# Patient Record
Sex: FEMALE | Race: WHITE | HISPANIC OR LATINO | Employment: FULL TIME | URBAN - METROPOLITAN AREA
[De-identification: names, ages, dates, MRNs, and addresses within clinical notes are randomized per-mention and may not be internally consistent; named-entity substitution may affect disease eponyms.]

---

## 2022-09-30 ENCOUNTER — HOSPITAL ENCOUNTER (OUTPATIENT)
Dept: ULTRASOUND IMAGING | Facility: CLINIC | Age: 27
Discharge: HOME | End: 2022-09-30
Attending: NURSE PRACTITIONER | Admitting: NURSE PRACTITIONER
Payer: COMMERCIAL

## 2022-09-30 DIAGNOSIS — R10.9 ABDOMINAL PAIN: ICD-10-CM

## 2022-09-30 PROCEDURE — 76830 TRANSVAGINAL US NON-OB: CPT

## 2022-09-30 PROCEDURE — 76856 US EXAM PELVIC COMPLETE: CPT | Mod: 26 | Performed by: RADIOLOGY

## 2022-09-30 PROCEDURE — 76830 TRANSVAGINAL US NON-OB: CPT | Mod: 26 | Performed by: RADIOLOGY

## 2022-11-07 ENCOUNTER — ANCILLARY PROCEDURE (OUTPATIENT)
Dept: ULTRASOUND IMAGING | Facility: CLINIC | Age: 27
End: 2022-11-07
Attending: NURSE PRACTITIONER
Payer: COMMERCIAL

## 2022-11-07 DIAGNOSIS — R10.9 ABDOMINAL PAIN: ICD-10-CM

## 2022-11-07 PROCEDURE — 76830 TRANSVAGINAL US NON-OB: CPT | Mod: GC | Performed by: RADIOLOGY

## 2022-11-07 PROCEDURE — 76856 US EXAM PELVIC COMPLETE: CPT | Mod: GC | Performed by: RADIOLOGY

## 2023-04-06 ENCOUNTER — NURSE TRIAGE (OUTPATIENT)
Dept: NURSING | Facility: CLINIC | Age: 28
End: 2023-04-06
Payer: COMMERCIAL

## 2023-04-06 NOTE — TELEPHONE ENCOUNTER
Patient donated blood 2 days ago.  Since then patient hasnt been feeling great. Today patient had chills, no fever, fatigued, covid test negative.    Patient had some dizziness earlier in the day, not now, has occasionally felt her heart beating a little faster, no breathing issues, hasnt passed out, is able to walk but couldn't make it through work today and came home early.    Patient has been pushing water, but not helping much.     Care advise: call the number on the blood drive information sheet to report symptoms, call Krish tomorrow for an appointment.  Continue to push fluids and rest.    Dejah Helm RN   04/06/23 6:29 PM  [facility] Nurse Advisor    Reason for Disposition    [1] MODERATE weakness (i.e., interferes with work, school, normal activities) AND [2] persists > 3 days    Additional Information    Negative: SEVERE difficulty breathing (e.g., struggling for each breath, speaks in single words)    Negative: Shock suspected (e.g., cold/pale/clammy skin, too weak to stand, low BP, rapid pulse)    Negative: Difficult to awaken or acting confused (e.g., disoriented, slurred speech)    Negative: [1] Fainted > 15 minutes ago AND [2] still feels too weak or dizzy to stand    Negative: [1] SEVERE weakness (i.e., unable to walk or barely able to walk, requires support) AND [2] new-onset or worsening    Negative: Sounds like a life-threatening emergency to the triager    Negative: Weakness of the face, arm or leg on one side of the body    Negative: [1] Diabetes mellitus AND [2] weakness from low blood sugar (i.e., < 60 mg/dl or 3.5 mmol/l)    Negative: Heat exhaustion suspected (i.e., dehydration from heat exposure)    Negative: Chest pain    Negative: Vomiting is main symptom    Negative: Diarrhea is main symptom    Negative: Difficulty breathing    Negative: Heart beating < 50 beats per minute OR > 140 beats per minute    Negative: Extra heart beats OR irregular heart beating  (i.e.,  "\"palpitations\")    Negative: Follows bleeding (e.g., from vomiting, rectum, vagina; Exception: small brief weakness from sight of a small amount blood)    Negative: Black or tarry bowel movements    Negative: [1] Drinking very little AND [2] dehydration suspected (e.g., no urine > 12 hours, very dry mouth, very lightheaded)    Negative: Patient sounds very sick or weak to the triager    Negative: [1] MODERATE weakness (i.e., interferes with work, school, normal activities) AND [2] cause unknown  (Exceptions: weakness with acute minor illness, or weakness from poor fluid intake)    Negative: [1] MODERATE weakness AND [2] from poor fluid intake AND [3] no improvement after 2 hours of rest and fluids    Negative: [1] Fever > 103 F (39.4 C) AND [2] not able to get the fever down using Fever Care Advice    Negative: [1] Fever > 101 F (38.3 C) AND [2] age > 60 years    Negative: [1] Fever > 100.0 F (37.8 C) AND [2] bedridden (e.g., nursing home patient, CVA, chronic illness, recovering from surgery)    Negative: [1] Fever > 100.0 F (37.8 C) AND [2] diabetes mellitus or weak immune system (e.g., HIV positive, cancer chemo, splenectomy, organ transplant, chronic steroids)    Negative: Pale skin (pallor)    Protocols used: WEAKNESS (GENERALIZED) AND FATIGUE-A-AH      "

## 2023-11-21 ENCOUNTER — HOSPITAL ENCOUNTER (OUTPATIENT)
Dept: ULTRASOUND IMAGING | Facility: CLINIC | Age: 28
Discharge: HOME | End: 2023-11-21
Attending: NURSE PRACTITIONER | Admitting: NURSE PRACTITIONER
Payer: COMMERCIAL

## 2023-11-21 DIAGNOSIS — R10.2 PELVIC PRESSURE IN FEMALE: ICD-10-CM

## 2023-11-21 DIAGNOSIS — Z87.42 HISTORY OF OVARIAN CYST: ICD-10-CM

## 2023-11-21 PROCEDURE — 76856 US EXAM PELVIC COMPLETE: CPT | Mod: 26 | Performed by: STUDENT IN AN ORGANIZED HEALTH CARE EDUCATION/TRAINING PROGRAM

## 2023-11-21 PROCEDURE — 76830 TRANSVAGINAL US NON-OB: CPT

## 2023-11-21 PROCEDURE — 76830 TRANSVAGINAL US NON-OB: CPT | Mod: 26 | Performed by: STUDENT IN AN ORGANIZED HEALTH CARE EDUCATION/TRAINING PROGRAM

## 2024-04-17 ENCOUNTER — NURSE TRIAGE (OUTPATIENT)
Dept: NURSING | Facility: CLINIC | Age: 29
End: 2024-04-17
Payer: COMMERCIAL

## 2024-04-17 VITALS
TEMPERATURE: 98.7 F | OXYGEN SATURATION: 99 % | DIASTOLIC BLOOD PRESSURE: 75 MMHG | WEIGHT: 137 LBS | HEART RATE: 74 BPM | HEIGHT: 62 IN | BODY MASS INDEX: 25.21 KG/M2 | SYSTOLIC BLOOD PRESSURE: 120 MMHG | RESPIRATION RATE: 20 BRPM

## 2024-04-17 PROCEDURE — 99284 EMERGENCY DEPT VISIT MOD MDM: CPT | Performed by: EMERGENCY MEDICINE

## 2024-04-17 PROCEDURE — 99283 EMERGENCY DEPT VISIT LOW MDM: CPT | Performed by: EMERGENCY MEDICINE

## 2024-04-17 RX ORDER — BUSPIRONE HYDROCHLORIDE 10 MG/1
10 TABLET ORAL 2 TIMES DAILY
COMMUNITY

## 2024-04-17 ASSESSMENT — COLUMBIA-SUICIDE SEVERITY RATING SCALE - C-SSRS
6. HAVE YOU EVER DONE ANYTHING, STARTED TO DO ANYTHING, OR PREPARED TO DO ANYTHING TO END YOUR LIFE?: NO
2. HAVE YOU ACTUALLY HAD ANY THOUGHTS OF KILLING YOURSELF IN THE PAST MONTH?: NO
1. IN THE PAST MONTH, HAVE YOU WISHED YOU WERE DEAD OR WISHED YOU COULD GO TO SLEEP AND NOT WAKE UP?: NO

## 2024-04-18 ENCOUNTER — HOSPITAL ENCOUNTER (EMERGENCY)
Facility: CLINIC | Age: 29
Discharge: HOME | End: 2024-04-18
Attending: EMERGENCY MEDICINE | Admitting: EMERGENCY MEDICINE
Payer: COMMERCIAL

## 2024-04-18 ENCOUNTER — APPOINTMENT (OUTPATIENT)
Dept: ULTRASOUND IMAGING | Facility: CLINIC | Age: 29
End: 2024-04-18
Attending: EMERGENCY MEDICINE
Payer: COMMERCIAL

## 2024-04-18 DIAGNOSIS — L03.90 CELLULITIS, UNSPECIFIED CELLULITIS SITE: ICD-10-CM

## 2024-04-18 PROCEDURE — 93971 EXTREMITY STUDY: CPT | Mod: 26 | Performed by: RADIOLOGY

## 2024-04-18 PROCEDURE — 93971 EXTREMITY STUDY: CPT | Mod: RT

## 2024-04-18 PROCEDURE — 250N000013 HC RX MED GY IP 250 OP 250 PS 637: Performed by: EMERGENCY MEDICINE

## 2024-04-18 RX ORDER — SULFAMETHOXAZOLE/TRIMETHOPRIM 800-160 MG
1 TABLET ORAL 2 TIMES DAILY
Qty: 14 TABLET | Refills: 0 | Status: SHIPPED | OUTPATIENT
Start: 2024-04-18 | End: 2024-04-25

## 2024-04-18 RX ORDER — SULFAMETHOXAZOLE/TRIMETHOPRIM 800-160 MG
1 TABLET ORAL ONCE
Qty: 1 TABLET | Refills: 0 | Status: COMPLETED | OUTPATIENT
Start: 2024-04-18 | End: 2024-04-18

## 2024-04-18 RX ADMIN — SULFAMETHOXAZOLE AND TRIMETHOPRIM 1 TABLET: 800; 160 TABLET ORAL at 01:27

## 2024-04-18 ASSESSMENT — ACTIVITIES OF DAILY LIVING (ADL): ADLS_ACUITY_SCORE: 33

## 2024-04-18 NOTE — TELEPHONE ENCOUNTER
"Nurse Triage SBAR    Situation: thigh pain    Background:   -Patient calling  -It is okay to call back and leave a detailed message at this number:    Assessment: Pt has had deep aching in right thigh which has been present intermittently for two days.  Rates pain 3/10. She also has swelling from groin to approximately two inches above her knee. She stated that the ache feels \"deep.\" Pain increases when she pushes on it. Pt was bitten by a leech on her right foot three days ago, followed by mild itching the same evening and next morning. Itching is improved.     -no difficulty breathing  -no fever    Recommendation: Unable to 2LT as pt not established. Go to ER.  Pt will go to [facility] ER.     -Plans to follow recommendations  -Call back with and questions, concerns, or any change in symptoms           Reason for Disposition   [1] Thigh or calf pain AND [2] only 1 side AND [3] present > 1 hour    Additional Information   Negative: SEVERE difficulty breathing (e.g., struggling for each breath, speaks in single words)   Negative: Looks like a broken bone or dislocated joint (e.g., crooked or deformed)   Negative: Sounds like a life-threatening emergency to the triager   Negative: Difficulty breathing at rest   Negative: Entire foot is cool or blue in comparison to other side   Negative: [1] Can't walk or can barely walk AND [2] new-onset   Negative: [1] Difficulty breathing with exertion (e.g., walking) AND [2] new-onset or worsening   Negative: [1] Red area or streak AND [2] fever   Negative: [1] Swelling is painful to touch AND [2] fever   Negative: [1] Cast on leg or ankle AND [2] now increased pain   Negative: Patient sounds very sick or weak to the triager    Protocols used: Leg Swelling and Edema-A-AH    "

## 2024-04-18 NOTE — ED PROVIDER NOTES
"ED Provider Note  [facility]      History     Chief Complaint   Patient presents with    Leg Swelling     right     HPI  Porsha Vigil is a 29 year old female who 3 days of pain and swelling in her upper thigh.  No history of DVT or PE.  No chest pain shortness of breath.  No fevers, chills, nausea, vomiting or sweats.  No known tick bites, bug bites, abrasions or injuries to the area.  She is otherwise healthy and has been feeling well.    Past Medical History  No past medical history on file.  No past surgical history on file.  busPIRone (BUSPAR) 10 MG tablet  FLUoxetine (PROZAC) 20 MG capsule      Allergies   Allergen Reactions    Clindamycin Anaphylaxis    Erythromycin Anaphylaxis    Keflex [Cephalexin] Anaphylaxis     Family History  No family history on file.  Social History          A medically appropriate review of systems was performed with pertinent positives and negatives noted in the HPI, and all other systems negative.    Physical Exam   BP: 120/75  Pulse: 74  Temp: 98.7  F (37.1  C)  Resp: 20  Height: 156.2 cm (5' 1.5\")  Weight: 62.1 kg (137 lb)  SpO2: 99 %  Physical Exam  Physical Exam   Constitutional: oriented to person, place, and time. appears well-developed and well-nourished.   HENT:   Head: Normocephalic and atraumatic.   Neck: Normal range of motion.   Pulmonary/Chest: Effort normal. No respiratory distress.   Cardiac: No murmurs, rubs, gallops. RRR.  Abdominal: Abdomen soft, nontender, nondistended. No rebound tenderness.  MSK: On the right thigh there is some erythema with tenderness and lymphadenopathy, no streaking of the skin.  Probable physical symmetric.  Lower extremity edema normal.  Neurological: alert and oriented to person, place, and time.   Skin: Skin is warm and dry.   Psychiatric:  normal mood and affect.  behavior is normal. Thought content normal.       ED Course, Procedures, & Data      Procedures            Results for orders placed or " performed during the hospital encounter of 04/18/24   US Lower Extremity Venous Duplex Right     Status: None (Preliminary result)    Impression    RESIDENT PRELIMINARY INTERPRETATION  IMPRESSION:  1.  No evidence of right lower extremity deep venous thrombosis.  2.  Multiple prominent lymph nodes within the medial thigh, with no  suspicious features.       Medications - No data to display  Labs Ordered and Resulted from Time of ED Arrival to Time of ED Departure - No data to display  US Lower Extremity Venous Duplex Right   Preliminary Result   RESIDENT PRELIMINARY INTERPRETATION   IMPRESSION:   1.  No evidence of right lower extremity deep venous thrombosis.   2.  Multiple prominent lymph nodes within the medial thigh, with no   suspicious features.                Critical care was not performed.     Medical Decision Making  The patient's presentation was of moderate complexity (an acute illness with systemic symptoms).    The patient's evaluation involved:  ordering and/or review of 1 test(s) in this encounter (ultrasound without DVT)  I did review the ultrasound as well, no DVT in my assessment    The patient's management necessitated moderate risk (prescription drug management including medications given in the ED).    Assessment & Plan    MDM  Patient resenting with right leg swelling.  Ultrasound negative for DVT.  Clinically she likely is a cellulitis.  No signs of sepsis.  Vitals are stable and she appears nontoxic.  She has multiple allergies to antibiotics we decided on Bactrim.  She is given a dose here.  Discussed drawing a line around her cellulitis and to keep an eye on it.  She was given return precautions regarding worsening infection.    I have reviewed the nursing notes. I have reviewed the findings, diagnosis, plan and need for follow up with the patient.    New Prescriptions    SULFAMETHOXAZOLE-TRIMETHOPRIM (BACTRIM DS) 800-160 MG TABLET    Take 1 tablet by mouth 2 times daily for 7 days        Final diagnoses:   Cellulitis, unspecified cellulitis site       Pavel HANSON [facility] EMERGENCY DEPARTMENT  4/17/2024     Pavel Brown MD  04/18/24 0122

## 2024-04-18 NOTE — DISCHARGE INSTRUCTIONS
She develop fevers, worsening pain, chills, sweats, vomiting return to the emergency department.    Your skin should start to improve 24 hours after the antibiotics, if you get significant worsening symptoms return to the emergency department, in this case he may need IV antibiotics

## 2024-04-18 NOTE — ED TRIAGE NOTES
Pt presents with c/o right leg swelling and warmth to upper right thigh. Pt noted a raised area, fist size at first growing to palm size. Pt states noted this about 3 days ago     Triage Assessment (Adult)       Row Name 04/17/24 9989          Triage Assessment    Airway WDL WDL        Respiratory WDL    Respiratory WDL WDL        Skin Circulation/Temperature WDL    Skin Circulation/Temperature WDL WDL        Cardiac WDL    Cardiac WDL WDL        Peripheral/Neurovascular WDL    Peripheral Neurovascular WDL WDL        Cognitive/Neuro/Behavioral WDL    Cognitive/Neuro/Behavioral WDL WDL

## 2024-08-14 ENCOUNTER — ANCILLARY PROCEDURE (OUTPATIENT)
Dept: ULTRASOUND IMAGING | Facility: CLINIC | Age: 29
End: 2024-08-14
Attending: NURSE PRACTITIONER
Payer: COMMERCIAL

## 2024-08-14 DIAGNOSIS — R10.31 RIGHT LOWER QUADRANT PAIN: ICD-10-CM

## 2024-08-14 PROCEDURE — 76856 US EXAM PELVIC COMPLETE: CPT | Mod: GC | Performed by: STUDENT IN AN ORGANIZED HEALTH CARE EDUCATION/TRAINING PROGRAM

## 2024-08-14 PROCEDURE — 76830 TRANSVAGINAL US NON-OB: CPT | Mod: GC | Performed by: STUDENT IN AN ORGANIZED HEALTH CARE EDUCATION/TRAINING PROGRAM

## 2024-12-05 ENCOUNTER — ANCILLARY PROCEDURE (OUTPATIENT)
Dept: ULTRASOUND IMAGING | Facility: CLINIC | Age: 29
End: 2024-12-05
Attending: NURSE PRACTITIONER
Payer: COMMERCIAL

## 2024-12-05 DIAGNOSIS — Z87.42 HISTORY OF OVARIAN CYST: ICD-10-CM

## 2024-12-05 DIAGNOSIS — R10.31 RLQ ABDOMINAL PAIN: ICD-10-CM

## 2024-12-05 PROCEDURE — 76830 TRANSVAGINAL US NON-OB: CPT | Mod: GC | Performed by: RADIOLOGY

## 2024-12-05 PROCEDURE — 76856 US EXAM PELVIC COMPLETE: CPT | Mod: GC | Performed by: RADIOLOGY

## 2025-07-08 ENCOUNTER — NURSE TRIAGE (OUTPATIENT)
Dept: NURSING | Facility: CLINIC | Age: 30
End: 2025-07-08
Payer: COMMERCIAL

## 2025-07-09 NOTE — TELEPHONE ENCOUNTER
Nurse Triage SBAR    Is this a 2nd Level Triage? NO    Situation:   dizziness    Background:   Pt was travelling to [place] and got back yesterday.  She travelled for 24 hours.    She feels like she hydrated well and ate during her travel.  Only other time she felt very dizzy was when she was donating blood.    Assessment:   This morning, she started to experience dizziness.   She feels nauseous and clammy.   She decided to sleep and when she woke up, she had a few minutes of disorientation.  Her symptoms didn't improve.    Protocol Recommended Disposition:   See PCP Within 24 Hours    Recommendation:   Advised pt to be seen tomorrow in clinic.  Care advice given.  Pt verbalized understanding.    Lillian Fischer RN, BSN Nurse Triage Advisor 7/8/2025 8:32 PM      Reason for Disposition   [1] MODERATE dizziness (e.g., interferes with normal activities) AND [2] has NOT been evaluated by doctor (or NP/PA) for this  (Exception: Dizziness caused by heat exposure, sudden standing, or poor fluid intake.)    Additional Information   Negative: SEVERE difficulty breathing (e.g., struggling for each breath, speaks in single words)   Negative: [1] Difficulty breathing or swallowing AND [2] started suddenly after medicine, an allergic food or bee sting   Negative: Shock suspected (e.g., cold/pale/clammy skin, too weak to stand, low BP, rapid pulse)   Negative: Difficult to awaken or acting confused (e.g., disoriented, slurred speech)   Negative: [1] Weakness (i.e., paralysis, loss of muscle strength) of the face, arm or leg on one side of the body AND [2] sudden onset AND [3] present now   Negative: [1] Numbness (i.e., loss of sensation) of the face, arm or leg on one side of the body AND [2] sudden onset AND [3] present now   Negative: [1] Loss of speech or garbled speech AND [2] sudden onset AND [3] present now   Negative: Overdose (accidental or intentional) of medications   Negative: [1] Fainted > 15 minutes ago AND [2] still feels  "too weak or dizzy to stand   Negative: Heart beating < 50 beats per minute OR > 140 beats per minute   Negative: Sounds like a life-threatening emergency to the triager   Negative: Chest pain   Negative: Rectal bleeding, bloody stool, or tarry-black stool   Negative: [1] Vomiting AND [2] contains red blood or black (\"coffee ground\") material   Negative: Vomiting is main symptom   Negative: Diarrhea is main symptom   Negative: Headache is main symptom   Negative: Patient states that they are having an anxiety or panic attack   Negative: Dizziness from low blood sugar (i.e., < 60 mg/dl or 3.5 mmol/l)   Negative: Dizziness is described as a spinning sensation (i.e., vertigo)   Negative: Heat exhaustion suspected (i.e., dehydration from heat exposure)   Negative: Difficulty breathing   Negative: SEVERE dizziness (e.g., unable to stand, requires support to walk, feels like passing out now)   Negative: Extra heartbeats, irregular heart beating, or heart is beating very fast  (i.e., \"palpitations\")   Negative: [1] Drinking very little AND [2] dehydration suspected (e.g., no urine > 12 hours, very dry mouth, very lightheaded)   Negative: [1] Weakness (i.e., paralysis, loss of muscle strength) of the face, arm / hand, or leg / foot on one side of the body AND [2] sudden onset AND [3] brief (now gone)   Negative: [1] Numbness (i.e., loss of sensation) of the face, arm / hand, or leg / foot on one side of the body AND [2] sudden onset AND [3] brief (now gone)   Negative: [1] Loss of speech or garbled speech AND [2] sudden onset AND [3] brief (now gone)   Negative: Loss of vision or double vision  (Exception: Similar to previous migraines.)   Negative: Patient sounds very sick or weak to the triager   Negative: [1] Dizziness caused by heat exposure, sudden standing, or poor fluid intake AND [2] no improvement after 2 hours of rest and fluids   Negative: [1] Fever > 103 F (39.4 C) AND [2] not able to get the fever down using " Fever Care Advice   Negative: [1] Fever > 101 F (38.3 C) AND [2] age > 60 years   Negative: [1] Fever > 100.0 F (37.8 C) AND [2] bedridden (e.g., CVA, chronic illness, recovering from surgery)   Negative: [1] Fever > 100.0 F (37.8 C) AND [2] diabetes mellitus or weak immune system (e.g., HIV positive, cancer chemo, splenectomy, organ transplant, chronic steroids)    Protocols used: Dizziness - [id number]

## 2025-07-18 ENCOUNTER — TRANSFERRED RECORDS (OUTPATIENT)
Dept: HEALTH INFORMATION MANAGEMENT | Facility: CLINIC | Age: 30
End: 2025-07-18
Payer: COMMERCIAL

## 2025-07-18 LAB
ALT SERPL-CCNC: 10 U/L (ref 0–55)
AST SERPL-CCNC: 21 U/L (ref 11–34)
CREATININE (EXTERNAL): 0.8 MG/DL (ref 0.57–1.11)
GFR ESTIMATED (EXTERNAL): >60 ML/MIN/1.73M2
GLUCOSE (EXTERNAL): 84 MG/DL (ref 70–124)
POTASSIUM (EXTERNAL): 4 MMOL/L (ref 3.5–5.1)
TSH SERPL-ACNC: 1.01 MIU/ML (ref 0.35–4.94)

## 2025-07-21 ENCOUNTER — MEDICAL CORRESPONDENCE (OUTPATIENT)
Dept: HEALTH INFORMATION MANAGEMENT | Facility: CLINIC | Age: 30
End: 2025-07-21
Payer: COMMERCIAL

## 2025-07-24 ENCOUNTER — TRANSCRIBE ORDERS (OUTPATIENT)
Dept: OTHER | Age: 30
End: 2025-07-24

## 2025-07-24 DIAGNOSIS — R42 LIGHTHEADEDNESS: Primary | ICD-10-CM

## 2025-07-28 NOTE — PROGRESS NOTES
Reason for Visit:  Today I have seen Porsha Vigil for lightheadedness  Consult: Yes    HPI : Status / Symptoms / Concerns     31y/o f with lightheadedness    Recent Cardiovascular Hospital Admission: No    Recent Heart Failure Admission: No          When further describing her dizziness, she states that the episodes will come on unexpectedly. When the symptoms occur, she will lie down and raise her legs, which typically resolves her symptoms. Endorses 3-4 episodes in the past week, which has tapered off since earlier this month. No association with exertion. No associated LOC.     States that she has been feeling run down lately. She has been traveling frequently, which has been fatiguing. She tries to keep herself well hydrated as much as possible. No family history of SCD. No other significant family cardiac history.     Cardiovascular risk factor profile:   (-) HTN, (-) DM, (-) hypercholesterolemia, (-)  prior tobacco use, (-) fam Hx SCD.     Chest Pain:   No  Shortness of Breath:  No  Ankle Swelling:  No  Muscle Aches:  No  Palpitations:   No    Lightheadedness:  Yes  Fainting:   No  Medication Issues:  No  Recent Test:  No    No past medical history on file.   No past surgical history on file.     Other Systems:  Resp - / GI - /MS - /Moni - /Psy - /Derm - /Hem - / - /Lymph - /ENT -/ Endo -  No other pertinent concern in systems review.     Social History: reports that she has never smoked. She has never used smokeless tobacco.   I have reviewed this patient's social history and updated it with pertinent information if needed.    Family History:  has no family status information on file.      No family history on file.    Medications:  Current Outpatient Medications   Medication Sig Dispense Refill    Ascorbic Acid (VITAMIN C) 500 MG CAPS Take by mouth.      busPIRone (BUSPAR) 10 MG tablet Take 10 mg by mouth 2 times daily (Patient taking differently: Take 15 mg by mouth 2 times daily.)      Ferrous  "Gluconate 239 (27 Fe) MG TABS Take by mouth.      sertraline (ZOLOFT) 100 MG tablet Take 150 mg by mouth daily.       No current facility-administered medications for this visit.        Exam:  /66 (BP Location: Right arm, Patient Position: Chair, Cuff Size: Adult Regular)   Pulse 60   Wt 60.7 kg (133 lb 14.4 oz)   SpO2 99%   BMI 24.89 kg/m     Body mass index is 24.89 kg/m .   General:  Alert, oriented, no acute distress  Eyes:  External exam normal, Conjunctivae noninjected and nonicteric.  Mouth:  Moist mucosa, restored teeth  Ears:  Hearing grossly intact  Neck:  No thyromegaly. Carotid upstroke normal, no carotid bruit, no JVD  Lungs:  Clear to auscultation. No wheezes, crackles, rales or rhonchi,      no accessory muscle use   Heart:  Regular, normal S1 and S2, no obvious murmurs, no rubs or gallops  Extremities: No ankle edema, age appropriate skin without stasis  Moni/Psy: Non-focal, normal mood, normal affect      Vital Trend:  Wt Readings from Last 5 Encounters:   07/30/25 60.7 kg (133 lb 14.4 oz)   04/17/24 62.1 kg (137 lb)     BP Readings from Last 5 Encounters:   07/30/25 109/66   04/17/24 120/75     Pulse Readings from Last 5 Encounters:   07/30/25 60   04/17/24 74      Body mass index is 24.89 kg/m .       Data:     ECG (2025): reviewed  NSR    Lab Review:  No results found for: \"CR\", \"LDL\", \"HDL\", \"BNP\", \"NTBNPI\", \"POTASSIUM\", \"NA\"    Assessment:     Porsha Vigil is a 30 year old female with unexplained intermittent dizziness.    Plan:     1. Dizziness  > 14 day zio patch monitor   > Hold on TTE for now   > isometric maneuvers  > good hydration  > lenient salt consumption  > heart healthy lifestyle (greens and daily physical activity)    Contingency Plan: no  Follow-up: PRN    I spent 60min for the chart preparation, visit and documentation   This note was software transcribed.      "

## 2025-07-30 ENCOUNTER — ORDERS ONLY (AUTO-RELEASED) (OUTPATIENT)
Dept: CARDIOLOGY | Facility: CLINIC | Age: 30
End: 2025-07-30

## 2025-07-30 ENCOUNTER — OFFICE VISIT (OUTPATIENT)
Dept: CARDIOLOGY | Facility: CLINIC | Age: 30
End: 2025-07-30
Attending: NURSE PRACTITIONER
Payer: COMMERCIAL

## 2025-07-30 VITALS
HEART RATE: 60 BPM | OXYGEN SATURATION: 99 % | WEIGHT: 133.9 LBS | SYSTOLIC BLOOD PRESSURE: 109 MMHG | BODY MASS INDEX: 24.89 KG/M2 | DIASTOLIC BLOOD PRESSURE: 66 MMHG

## 2025-07-30 DIAGNOSIS — R00.2 PALPITATIONS: ICD-10-CM

## 2025-07-30 DIAGNOSIS — R42 LIGHTHEADEDNESS: ICD-10-CM

## 2025-07-30 DIAGNOSIS — R00.2 PALPITATIONS: Primary | ICD-10-CM

## 2025-07-30 PROCEDURE — 1126F AMNT PAIN NOTED NONE PRSNT: CPT | Performed by: INTERNAL MEDICINE

## 2025-07-30 PROCEDURE — 3078F DIAST BP <80 MM HG: CPT | Performed by: INTERNAL MEDICINE

## 2025-07-30 PROCEDURE — 99205 OFFICE O/P NEW HI 60 MIN: CPT | Performed by: INTERNAL MEDICINE

## 2025-07-30 PROCEDURE — 3074F SYST BP LT 130 MM HG: CPT | Performed by: INTERNAL MEDICINE

## 2025-07-30 PROCEDURE — 99213 OFFICE O/P EST LOW 20 MIN: CPT | Performed by: INTERNAL MEDICINE

## 2025-07-30 RX ORDER — SERTRALINE HYDROCHLORIDE 100 MG/1
150 TABLET, FILM COATED ORAL DAILY
COMMUNITY
Start: 2024-07-15

## 2025-07-30 RX ORDER — MULTIVIT-MIN/IRON/FOLIC ACID/K 18-600-40
CAPSULE ORAL
COMMUNITY

## 2025-07-30 ASSESSMENT — PAIN SCALES - GENERAL: PAINLEVEL_OUTOF10: NO PAIN (0)

## 2025-07-30 NOTE — PATIENT INSTRUCTIONS
Dr. Blue recommends:    14 day zio patch heart monitor.    Follow up as needed.    Thank you for your visit today.  Please call me with any questions or concerns.   Tanvir Alvarado RN  Cardiology Care Coordinator  [phone]

## 2025-07-30 NOTE — NURSING NOTE
Chief Complaint   Patient presents with    New Patient     7/30/25--Dr. Blue: Patient is referred by for cardiac evaluation related to history of Lightheadedness.       Vitals were taken, medications reconciled.    Preston Selby, EMT    7:13 AM

## 2025-08-26 LAB — CV ZIO PRELIM RESULTS: NORMAL
